# Patient Record
Sex: MALE | Race: WHITE | Employment: FULL TIME | URBAN - METROPOLITAN AREA
[De-identification: names, ages, dates, MRNs, and addresses within clinical notes are randomized per-mention and may not be internally consistent; named-entity substitution may affect disease eponyms.]

---

## 2021-01-12 ENCOUNTER — HOSPITAL ENCOUNTER (INPATIENT)
Age: 41
LOS: 3 days | Discharge: HOME | DRG: 756 | End: 2021-01-15
Attending: EMERGENCY MEDICINE | Admitting: PSYCHIATRY & NEUROLOGY
Payer: MEDICAID

## 2021-01-12 DIAGNOSIS — T14.91XA SUICIDE ATTEMPT (HCC): Primary | ICD-10-CM

## 2021-01-12 PROBLEM — R45.851 DEPRESSION WITH SUICIDAL IDEATION: Status: ACTIVE | Noted: 2021-01-12

## 2021-01-12 PROBLEM — F32.A DEPRESSION WITH SUICIDAL IDEATION: Status: ACTIVE | Noted: 2021-01-12

## 2021-01-12 LAB
ABSOLUTE EOS #: 0.2 K/UL (ref 0–0.4)
ABSOLUTE IMMATURE GRANULOCYTE: ABNORMAL K/UL (ref 0–0.3)
ABSOLUTE LYMPH #: 2.4 K/UL (ref 1–4.8)
ABSOLUTE MONO #: 0.6 K/UL (ref 0.1–1.3)
ACETAMINOPHEN LEVEL: <5 UG/ML (ref 10–30)
ALBUMIN SERPL-MCNC: 4.5 G/DL (ref 3.5–5.2)
ALBUMIN/GLOBULIN RATIO: NORMAL (ref 1–2.5)
ALP BLD-CCNC: 83 U/L (ref 40–129)
ALT SERPL-CCNC: 16 U/L (ref 5–41)
ANION GAP SERPL CALCULATED.3IONS-SCNC: 10 MMOL/L (ref 9–17)
AST SERPL-CCNC: 13 U/L
BASOPHILS # BLD: 1 % (ref 0–2)
BASOPHILS ABSOLUTE: 0.1 K/UL (ref 0–0.2)
BILIRUB SERPL-MCNC: 0.49 MG/DL (ref 0.3–1.2)
BUN BLDV-MCNC: 8 MG/DL (ref 6–20)
BUN/CREAT BLD: NORMAL (ref 9–20)
CALCIUM SERPL-MCNC: 9.7 MG/DL (ref 8.6–10.4)
CHLORIDE BLD-SCNC: 101 MMOL/L (ref 98–107)
CO2: 26 MMOL/L (ref 20–31)
CREAT SERPL-MCNC: 0.8 MG/DL (ref 0.7–1.2)
DIFFERENTIAL TYPE: ABNORMAL
EOSINOPHILS RELATIVE PERCENT: 2 % (ref 0–4)
ETHANOL PERCENT: <0.01 %
ETHANOL: <10 MG/DL
GFR AFRICAN AMERICAN: >60 ML/MIN
GFR NON-AFRICAN AMERICAN: >60 ML/MIN
GFR SERPL CREATININE-BSD FRML MDRD: NORMAL ML/MIN/{1.73_M2}
GFR SERPL CREATININE-BSD FRML MDRD: NORMAL ML/MIN/{1.73_M2}
GLUCOSE BLD-MCNC: 79 MG/DL (ref 70–99)
HCT VFR BLD CALC: 44.4 % (ref 41–53)
HEMOGLOBIN: 15.3 G/DL (ref 13.5–17.5)
IMMATURE GRANULOCYTES: ABNORMAL %
LYMPHOCYTES # BLD: 22 % (ref 24–44)
MCH RBC QN AUTO: 28 PG (ref 26–34)
MCHC RBC AUTO-ENTMCNC: 34.6 G/DL (ref 31–37)
MCV RBC AUTO: 80.9 FL (ref 80–100)
MONOCYTES # BLD: 5 % (ref 1–7)
NRBC AUTOMATED: ABNORMAL PER 100 WBC
PDW BLD-RTO: 13.7 % (ref 11.5–14.9)
PLATELET # BLD: 210 K/UL (ref 150–450)
PLATELET ESTIMATE: ABNORMAL
PMV BLD AUTO: 8.7 FL (ref 6–12)
POTASSIUM SERPL-SCNC: 3.8 MMOL/L (ref 3.7–5.3)
RBC # BLD: 5.49 M/UL (ref 4.5–5.9)
RBC # BLD: ABNORMAL 10*6/UL
SALICYLATE LEVEL: <1 MG/DL (ref 3–10)
SARS-COV-2, RAPID: NOT DETECTED
SARS-COV-2: NORMAL
SARS-COV-2: NORMAL
SEG NEUTROPHILS: 70 % (ref 36–66)
SEGMENTED NEUTROPHILS ABSOLUTE COUNT: 7.9 K/UL (ref 1.3–9.1)
SODIUM BLD-SCNC: 137 MMOL/L (ref 135–144)
SOURCE: NORMAL
TOTAL PROTEIN: 7.6 G/DL (ref 6.4–8.3)
TOXIC TRICYCLIC SC,BLOOD: ABNORMAL
WBC # BLD: 11.2 K/UL (ref 3.5–11)
WBC # BLD: ABNORMAL 10*3/UL

## 2021-01-12 PROCEDURE — U0002 COVID-19 LAB TEST NON-CDC: HCPCS

## 2021-01-12 PROCEDURE — 80053 COMPREHEN METABOLIC PANEL: CPT

## 2021-01-12 PROCEDURE — 90715 TDAP VACCINE 7 YRS/> IM: CPT | Performed by: STUDENT IN AN ORGANIZED HEALTH CARE EDUCATION/TRAINING PROGRAM

## 2021-01-12 PROCEDURE — 6360000002 HC RX W HCPCS: Performed by: STUDENT IN AN ORGANIZED HEALTH CARE EDUCATION/TRAINING PROGRAM

## 2021-01-12 PROCEDURE — 99285 EMERGENCY DEPT VISIT HI MDM: CPT

## 2021-01-12 PROCEDURE — 85025 COMPLETE CBC W/AUTO DIFF WBC: CPT

## 2021-01-12 PROCEDURE — G0480 DRUG TEST DEF 1-7 CLASSES: HCPCS

## 2021-01-12 PROCEDURE — 90471 IMMUNIZATION ADMIN: CPT | Performed by: STUDENT IN AN ORGANIZED HEALTH CARE EDUCATION/TRAINING PROGRAM

## 2021-01-12 PROCEDURE — 80307 DRUG TEST PRSMV CHEM ANLYZR: CPT

## 2021-01-12 PROCEDURE — 36415 COLL VENOUS BLD VENIPUNCTURE: CPT

## 2021-01-12 PROCEDURE — 80179 DRUG ASSAY SALICYLATE: CPT

## 2021-01-12 PROCEDURE — 80143 DRUG ASSAY ACETAMINOPHEN: CPT

## 2021-01-12 PROCEDURE — 1240000000 HC EMOTIONAL WELLNESS R&B

## 2021-01-12 RX ORDER — MAGNESIUM HYDROXIDE/ALUMINUM HYDROXICE/SIMETHICONE 120; 1200; 1200 MG/30ML; MG/30ML; MG/30ML
30 SUSPENSION ORAL EVERY 6 HOURS PRN
Status: DISCONTINUED | OUTPATIENT
Start: 2021-01-12 | End: 2021-01-15 | Stop reason: HOSPADM

## 2021-01-12 RX ORDER — IBUPROFEN 400 MG/1
400 TABLET ORAL EVERY 6 HOURS PRN
Status: DISCONTINUED | OUTPATIENT
Start: 2021-01-12 | End: 2021-01-15 | Stop reason: HOSPADM

## 2021-01-12 RX ORDER — HYDROXYZINE 50 MG/1
50 TABLET, FILM COATED ORAL 3 TIMES DAILY PRN
Status: DISCONTINUED | OUTPATIENT
Start: 2021-01-12 | End: 2021-01-15 | Stop reason: HOSPADM

## 2021-01-12 RX ORDER — HALOPERIDOL 5 MG/ML
5 INJECTION INTRAMUSCULAR EVERY 4 HOURS PRN
Status: DISCONTINUED | OUTPATIENT
Start: 2021-01-12 | End: 2021-01-15 | Stop reason: HOSPADM

## 2021-01-12 RX ORDER — LORAZEPAM 2 MG/ML
2 INJECTION INTRAMUSCULAR EVERY 4 HOURS PRN
Status: DISCONTINUED | OUTPATIENT
Start: 2021-01-12 | End: 2021-01-15 | Stop reason: HOSPADM

## 2021-01-12 RX ORDER — POLYETHYLENE GLYCOL 3350 17 G/17G
17 POWDER, FOR SOLUTION ORAL DAILY PRN
Status: DISCONTINUED | OUTPATIENT
Start: 2021-01-12 | End: 2021-01-15 | Stop reason: HOSPADM

## 2021-01-12 RX ORDER — LORAZEPAM 1 MG/1
2 TABLET ORAL EVERY 4 HOURS PRN
Status: DISCONTINUED | OUTPATIENT
Start: 2021-01-12 | End: 2021-01-15 | Stop reason: HOSPADM

## 2021-01-12 RX ORDER — NICOTINE 21 MG/24HR
1 PATCH, TRANSDERMAL 24 HOURS TRANSDERMAL DAILY
Status: DISCONTINUED | OUTPATIENT
Start: 2021-01-13 | End: 2021-01-15 | Stop reason: HOSPADM

## 2021-01-12 RX ORDER — DIPHENHYDRAMINE HYDROCHLORIDE 50 MG/ML
50 INJECTION INTRAMUSCULAR; INTRAVENOUS EVERY 4 HOURS PRN
Status: DISCONTINUED | OUTPATIENT
Start: 2021-01-12 | End: 2021-01-15 | Stop reason: HOSPADM

## 2021-01-12 RX ORDER — TRAZODONE HYDROCHLORIDE 50 MG/1
50 TABLET ORAL NIGHTLY PRN
Status: DISCONTINUED | OUTPATIENT
Start: 2021-01-12 | End: 2021-01-15 | Stop reason: HOSPADM

## 2021-01-12 RX ORDER — ACETAMINOPHEN 325 MG/1
650 TABLET ORAL EVERY 4 HOURS PRN
Status: DISCONTINUED | OUTPATIENT
Start: 2021-01-12 | End: 2021-01-15 | Stop reason: HOSPADM

## 2021-01-12 RX ORDER — HALOPERIDOL 10 MG/1
5 TABLET ORAL EVERY 4 HOURS PRN
Status: DISCONTINUED | OUTPATIENT
Start: 2021-01-12 | End: 2021-01-15 | Stop reason: HOSPADM

## 2021-01-12 RX ADMIN — TETANUS TOXOID, REDUCED DIPHTHERIA TOXOID AND ACELLULAR PERTUSSIS VACCINE, ADSORBED 0.5 ML: 5; 2.5; 8; 8; 2.5 SUSPENSION INTRAMUSCULAR at 18:39

## 2021-01-12 ASSESSMENT — SLEEP AND FATIGUE QUESTIONNAIRES
DO YOU HAVE DIFFICULTY SLEEPING: YES
DIFFICULTY STAYING ASLEEP: YES
DIFFICULTY FALLING ASLEEP: YES
RESTFUL SLEEP: NO
AVERAGE NUMBER OF SLEEP HOURS: 4

## 2021-01-12 NOTE — ED PROVIDER NOTES
 Smoking status: Current Every Day Smoker     Packs/day: 1.00    Smokeless tobacco: Never Used   Substance and Sexual Activity    Alcohol use: Not Currently    Drug use: Not Currently    Sexual activity: Not on file   Lifestyle    Physical activity     Days per week: Not on file     Minutes per session: Not on file    Stress: Not on file   Relationships    Social connections     Talks on phone: Not on file     Gets together: Not on file     Attends Baptism service: Not on file     Active member of club or organization: Not on file     Attends meetings of clubs or organizations: Not on file     Relationship status: Not on file    Intimate partner violence     Fear of current or ex partner: Not on file     Emotionally abused: Not on file     Physically abused: Not on file     Forced sexual activity: Not on file   Other Topics Concern    Not on file   Social History Narrative    Not on file       History reviewed. No pertinent family history. Allergies:  Patient has no known allergies. Home Medications:  Prior to Admission medications    Not on File       REVIEW OFSYSTEMS    (2-9 systems for level 4, 10 or more for level 5)      Review of Systems   Constitutional: Negative for chills, diaphoresis, fatigue and fever. HENT: Negative for rhinorrhea, sore throat, tinnitus and trouble swallowing. Eyes: Negative for visual disturbance. Respiratory: Negative for cough, chest tightness, shortness of breath and wheezing. Cardiovascular: Negative for chest pain and leg swelling. Gastrointestinal: Negative for abdominal distention, abdominal pain, constipation, diarrhea, nausea and vomiting. Endocrine: Negative for polyuria. Genitourinary: Negative for dysuria, flank pain and frequency. Musculoskeletal: Negative for arthralgias, back pain, joint swelling and myalgias. Skin: Positive for wound. Neurological: Negative for dizziness, tremors, seizures, weakness, light-headedness, numbness and headaches. Psychiatric/Behavioral: Positive for self-injury and suicidal ideas. PHYSICAL EXAM   (up to 7 for level 4, 8 or more forlevel 5)      INITIAL VITALS:   ED Triage Vitals   BP Temp Temp src Pulse Resp SpO2 Height Weight   -- -- -- -- -- -- -- --       Physical Exam  Constitutional:       General: He is not in acute distress. Appearance: He is well-developed. HENT:      Head: Normocephalic and atraumatic. Right Ear: External ear normal.      Left Ear: External ear normal.   Eyes:      General: No scleral icterus. Right eye: No discharge. Left eye: No discharge. Conjunctiva/sclera: Conjunctivae normal.      Pupils: Pupils are equal, round, and reactive to light. Neck:      Musculoskeletal: Normal range of motion. Vascular: No JVD. Trachea: No tracheal deviation. Cardiovascular:      Rate and Rhythm: Regular rhythm. Heart sounds: Normal heart sounds. Pulmonary:      Effort: Pulmonary effort is normal. No respiratory distress. Breath sounds: Normal breath sounds. No stridor. No wheezing. Abdominal:      General: Bowel sounds are normal. There is no distension. Palpations: Abdomen is soft. Tenderness: There is no abdominal tenderness. There is no guarding or rebound. Musculoskeletal: Normal range of motion. General: No tenderness or deformity. Comments: Superficial laceration 4 cm in length vertically bilateral wrists, barely breaking the skin with   Skin:     General: Skin is warm. Coloration: Skin is not pale. Neurological:      Mental Status: He is alert and oriented to person, place, and time. Cranial Nerves: No cranial nerve deficit.          DIFFERENTIAL  DIAGNOSIS     PLAN (LABS / IMAGING / EKG):  Orders Placed This Encounter   Procedures    CBC Auto Differential    Comprehensive Metabolic Panel  TOX SCR, BLD, ED    Urine Drug Screen    Urinalysis Reflex to Culture    COVID-19    DIET GENERAL;    Activity as tolerated    Vital signs, psych    Patient monitoring close Q15 minutes    Misc nursing order (specify)    Full code    IP Consult to History and Physical    PATIENT STATUS (FROM ED OR OR/PROCEDURAL) Inpatient       MEDICATIONS ORDERED:  Orders Placed This Encounter   Medications    Tetanus-Diphth-Acell Pertussis (BOOSTRIX) injection 0.5 mL    acetaminophen (TYLENOL) tablet 650 mg    aluminum & magnesium hydroxide-simethicone (MAALOX) 200-200-20 MG/5ML suspension 30 mL    hydrOXYzine (ATARAX) tablet 50 mg    ibuprofen (ADVIL;MOTRIN) tablet 400 mg    traZODone (DESYREL) tablet 50 mg    polyethylene glycol (GLYCOLAX) packet 17 g    DISCONTD: nicotine polacrilex (NICORETTE) gum 2 mg    AND Linked Order Group     haloperidol (HALDOL) tablet 5 mg     LORazepam (ATIVAN) tablet 2 mg    AND Linked Order Group     haloperidol lactate (HALDOL) injection 5 mg     LORazepam (ATIVAN) injection 2 mg     diphenhydrAMINE (BENADRYL) injection 50 mg    nicotine (NICODERM CQ) 14 MG/24HR 1 patch    sertraline (ZOLOFT) tablet 25 mg    sertraline (ZOLOFT) tablet 50 mg       DDX: Suicide attempt, depression, polysubstance use,    Initial MDM/Plan/ED COURSE:    38 y.o. male who presents with concerns for suicide attempt by cutting himself with razor blades, although patient is \"not confirming or denying \"suicidal ideation, suicide attempt by cutting his wrist, patient does have 2 previous suicide attempts over the past 2 months, stated to staff that if he goes home he will do it again with a knife this time. Patient to be admitted for suicidal ideation.     DIAGNOSTIC RESULTS / EMERGENCYDEPARTMENT COURSE / MDM     LABS:  Labs Reviewed   CBC WITH AUTO DIFFERENTIAL - Abnormal; Notable for the following components:       Result Value    WBC 11.2 (*)     Seg Neutrophils 70 (*) Lymphocytes 22 (*)     All other components within normal limits   TOX SCR, BLD, ED - Abnormal; Notable for the following components:    Acetaminophen Level <5 (*)     Salicylate Lvl <1 (*)     All other components within normal limits   COMPREHENSIVE METABOLIC PANEL   CVLTK-00   URINE DRUG SCREEN   URINE RT REFLEX TO CULTURE           No results found. PROCEDURES:  None    CONSULTS:  IP CONSULT TO HISTORY AND PHYSICAL    CRITICAL CARE:  Please see attending note    FINAL IMPRESSION      1. Suicide attempt [facility])          DISPOSITION / PLAN     DISPOSITION Admitted 01/12/2021 08:41:19 PM      PATIENT REFERRED TO:  [facility] ED  [facility] [address]  [phone]    As needed      DISCHARGE MEDICATIONS:  There are no discharge medications for this patient.       Ondina Maldonado MD  Emergency Medicine Resident    (Please note that portions of this note were completed with a voice recognition program.Efforts were made to edit the dictations but occasionally words are mis-transcribed.)        Ondina Maldonado MD  Resident  01/13/21 0181

## 2021-01-12 NOTE — ED NOTES
Bed: 14  Expected date:   Expected time:   Means of arrival:   Comments:   Rohith Spearsrosa isela [facility]  01/12/21 0816

## 2021-01-12 NOTE — ED NOTES
Lacerations cleaned with soap and water. Lacerations are superficial with no bleeding noted. 4x4s placed over and wrapped with kerlex.       Richelle Mcbride RN  01/12/21 2048

## 2021-01-12 NOTE — ED TRIAGE NOTES
Mode of arrival (squad #, walk in, police, etc) : Medic 21        Chief complaint(s): Suicide attempt        Arrival Note (brief scenario, treatment PTA, etc). : Pt arrives to ED via EMS for a suicide attempt. Per EMS patients' wife called EMS after he messaged her on facebook that he was going to kill himself. Patient was found to have bilateral 4 inch lacerations to each forearm. Bleeding in controlled. Upon arrival to ED patient is alert and oriented. Patient is cooperative but does not answer many questions. Patient responds \"take it how you want it\" when asked if he was trying to kill himself. Patient denies previous suicide attempts to writer. Safeguardplaced at bedsidefor patient watch. Safeguard informed that they need to stay with the patient at all time, must be present in the room and if they need a break or relief to let the nurse know so they can be replaced. Safeguard verbalizes understanding. Belongings and patient checked by security. Belongings locked up. Pt in blue gown. C= \"Have you ever felt that you should Cut down on your drinking? \"  No  A= \"Have people Annoyed you by criticizing your drinking? \"  No  G= \"Have you ever felt bad or Guilty about your drinking? \"  No  E= \"Have you ever had a drink as an Eye-opener first thing in the morning to steady your nerves or to help a hangover? \"  No      Deferred []      Reason for deferring: N/A    *If yes to two or more: probable alcohol abuse. *

## 2021-01-13 PROBLEM — F33.2 MDD (MAJOR DEPRESSIVE DISORDER), RECURRENT SEVERE, WITHOUT PSYCHOSIS (HCC): Status: ACTIVE | Noted: 2021-01-13

## 2021-01-13 PROCEDURE — 99223 1ST HOSP IP/OBS HIGH 75: CPT | Performed by: PSYCHIATRY & NEUROLOGY

## 2021-01-13 PROCEDURE — 1240000000 HC EMOTIONAL WELLNESS R&B

## 2021-01-13 PROCEDURE — 6370000000 HC RX 637 (ALT 250 FOR IP): Performed by: NURSE PRACTITIONER

## 2021-01-13 PROCEDURE — 6370000000 HC RX 637 (ALT 250 FOR IP): Performed by: PSYCHIATRY & NEUROLOGY

## 2021-01-13 RX ORDER — SERTRALINE HYDROCHLORIDE 25 MG/1
25 TABLET, FILM COATED ORAL ONCE
Status: COMPLETED | OUTPATIENT
Start: 2021-01-13 | End: 2021-01-13

## 2021-01-13 RX ADMIN — TRAZODONE HYDROCHLORIDE 50 MG: 50 TABLET ORAL at 22:13

## 2021-01-13 RX ADMIN — HYDROXYZINE HYDROCHLORIDE 50 MG: 50 TABLET, FILM COATED ORAL at 22:13

## 2021-01-13 RX ADMIN — SERTRALINE HYDROCHLORIDE 25 MG: 25 TABLET ORAL at 13:40

## 2021-01-13 ASSESSMENT — ENCOUNTER SYMPTOMS
VOMITING: 0
BACK PAIN: 0
COUGH: 0
NAUSEA: 0
SHORTNESS OF BREATH: 0
WHEEZING: 0
SORE THROAT: 0
DIARRHEA: 0
TROUBLE SWALLOWING: 0
ABDOMINAL DISTENTION: 0
CHEST TIGHTNESS: 0
CONSTIPATION: 0
ABDOMINAL PAIN: 0
RHINORRHEA: 0

## 2021-01-13 ASSESSMENT — SLEEP AND FATIGUE QUESTIONNAIRES
RESTFUL SLEEP: YES
DO YOU HAVE DIFFICULTY SLEEPING: NO
DIFFICULTY STAYING ASLEEP: NO
DIFFICULTY FALLING ASLEEP: NO

## 2021-01-13 ASSESSMENT — LIFESTYLE VARIABLES: HISTORY_ALCOHOL_USE: NO

## 2021-01-13 NOTE — GROUP NOTE
Group Therapy Note    Date: 1/13/2021    Group Start Time: 0900  Group End Time: 0930  Group Topic: Community Meeting    [facility] Lauretta Hodgkins, [address]         Group Therapy Note    Attendees: 6/20            Patient's Goal:  To increase social interaction and to explore and identify short term goals r/t wellness and recovery. RT discussed group schedule and unit structure/resources and encouraged pts to be engaged in their treatment. Pts were given opportunities to ask questions. Notes: Pt participated in group task and was able to identify short term goals r/t wellness and recovery. Pt is pleasant and supportive of peers        Status After Intervention:  Improved     Participation Level:  Active Listener and Interactive     Participation Quality: Appropriate, Attentive, Sharing         Speech:   Normal     Thought Process/Content: Logical,linear     Affective Functioning: Blunted     Mood: euthymic        Level of consciousness:  Alert, and Attentive        Response to Learning: Able to verbalize current knowledge/experience, Able to verbalize/acknowledge new learning, and Progressing to goal        Endings: None Reported     Modes of Intervention: Education, Support, Socialization, Exploration, Clarifying and Problem-solving        Discipline Responsible: Psychoeducational Specialist        Signature:  Dav Ames

## 2021-01-13 NOTE — ED PROVIDER NOTES
[address] [facility] ED     Emergency Department     Faculty Attestation        I performed a history and physical examination of the patient and discussed management with the resident. I reviewed the residents note and agree with the documented findings and plan of care. Any areas of disagreement are noted on the chart. I was personally present for the key portions of any procedures. I have documented in the chart those procedures where I was not present during the key portions. I have reviewed the emergency nurses triage note. I agree with the chief complaint, past medical history, past surgical history, allergies, medications, social and family history as documented unless otherwise noted below. Documentation of the HPI, Physical Exam and Medical Decision Making performed by medical students or scribes is based on my personal performance of the HPI, PE and MDM. For Physician Assistant/ Nurse Practitioner cases/documentation I have have had a face to face evaluation with this patient and have completed at least one if not all key elements of the E/M (history, physical exam, and MDM). Additional findings are as noted. Pertinent Comments     Questionable suicide attempt. On pink slip, will admit.     (Please note that portions of this note were completed with a voice recognition program.  Efforts were made to edit the dictations but occasionally words are mis-transcribed.)    Justo Dawkins DO  Attending Emergency Physician         Justo Dawkins DO  01/12/21 9243

## 2021-01-13 NOTE — H&P
HISTORY and [facility] [address]       NAME:  Mickey Shane  MRN: [id number]   YOB: 1980   Date: 1/13/2021   Age: 38 y.o. Gender: male     COMPLAINT AND PRESENT HISTORY:      Mickey Shane is 38 y.o.,  male, admitted via [facility] ED because of depression. Per chart review, Pt presented to ED accompanied by EMS for suicidal ideation with signs of attempt by cutting his wrists. Pt was determined to be medically stable per ED provider and cleared for transfer to [facility] for mental health evaluation. History of previous suicide attempts by cutting and overdose. Pt denies suicidal and homicidal ideation. No current auditory, visual or tactile hallucinations. Pt has poor sleep having difficulty falling asleep and staying asleep. Reports he is able to sleep for two hours but then wakes up. Reports decreased appetite recently. Current everyday smoker 1 PPD cigarettes. Smoking cessation encouraged. Denies alcohol or illicit drug use. Currently lives with his wife. Pt has superficial lacerations to bilateral wrists which are mildly erythematous, no bleeding, no drainage, no warmth. No signs of infection present. Pt denies lacerations are painful. Pt denies somatic complaints including chest pain/presure, palpitations, SOB, recent URI, N/V/D or constipation, fever or chills. DIAGNOSTIC RESULTS   Labs:  CBC:   Recent Labs     01/12/21  1835   WBC 11.2*   HGB 15.3        BMP:    Recent Labs     01/12/21  1835      K 3.8      CO2 26   BUN 8   CREATININE 0.80   GLUCOSE 79     Hepatic:   Recent Labs     01/12/21  1835   AST 13   ALT 16   BILITOT 0.49   ALKPHOS 83       PAST MEDICAL HISTORY   History reviewed. No pertinent past medical history.     Pt denies any history of Diabetes mellitus type 2, hypertension, stroke, heart disease, COPD, Asthma, GERD, HLD, Cancer, Seizures,Thyroid disease, Kidney Disease, Hepatitis, TB. Gastrointestinal tract: No abdominal pain, nausea, vomiting, dysphagia, diarrhea or constipation. Genitourinary:  No burning on micturition. No hesitancy, urgency, frequency or discoloration of urine. Locomotor:  No bone or joint pains. No swelling or deformities. Neuropsychiatric:  See HPI. GENERAL PHYSICAL EXAM:     Vitals: /78   Pulse 83   Temp 98 °F (36.7 °C) (Oral)   Resp 14   Ht 5' 6\" (1.676 m)   Wt 160 lb (72.6 kg)   SpO2 99%   BMI 25.82 kg/m²  Body mass index is 25.82 kg/m². Pt was examined with a nurse present in the room. GENERAL APPEARANCE:  Shira Corado is 38 y.o.,  male, not obese, nourished, conscious, alert. Does not appear to be in distress or pain at this time. SKIN:  Superficial lacerations to bilateral wrists which are mildly erythematous, no bleeding, no drainage, no warmth. Warm, dry, no cyanosis or jaundice. HEAD:  Normocephalic, atraumatic. EYES:  Pupils equal, reactive to light, Conjunctiva is clear, EOMs intact anil. eyelids WNL. EARS:  No discharge, no marked hearing loss. NOSE:  No rhinorrhea, epistaxis or septal deformity. THROAT:  Not congested. No ulceration bleeding or discharge. NECK:  No stiffness, trachea central.  No palpable masses or L.N.      CHEST:  Symmetrical and equal on expansion. HEART:  Regular rate and rhythm. S1 > S2, No audible murmurs or gallops. LUNGS:  Equal on expansion, normal breath sounds. No wheezing, rhonchi or rales. ABDOMEN:  Soft on palpation. No localized tenderness. No guarding or rigidity. LOCOMOTOR, BACK AND SPINE:  No tenderness or deformities. EXTREMITIES:   strength equal bilaterally, radial pulses palpable 3+ bilaterally, capillary refill brisk bilateral fingers. Symmetrical, no pretibial edema. No calf tenderness. No discoloration or ulcerations. NEUROLOGIC:  The patient is conscious, alert, oriented,Cranial nerve II-XII intact, taste and smell were not examined. No apparent focal sensory or motor deficits. Muscle strength equal Mu. No facial droop, tongue protrudes centrally, no slurring of the speech. PROVISIONAL DIAGNOSES:      Active Problems:    Depression with suicidal ideation  Resolved Problems:    * No resolved hospital problems. *    ASSESSMENT AND PLAN:    1. Depression with suicidal ideation:  Con't current tx plan per psychiatry. Medications to be reviewed per attending and con't per admitting service. 2. Superficial lacerations to bilateral wrists which are mildly erythematous, no bleeding, no drainage, no warmth. Con't to monitor for s/s of infection. 3. No acute medical issues requiring interventions at this time.  Con't to monitor CEASAR Beckett CNP on 1/13/2021 at 10:01 AM

## 2021-01-13 NOTE — ED NOTES
Report given to Judith Berger RN from ED. Report method in person   The following was reviewed with receiving RN:   Current vital signs:  /79   Pulse 83   Temp 98.1 °F (36.7 °C) (Oral)   Resp 20   SpO2 99%                      Any medication or safety alerts were reviewed. Any pending diagnostics and notifications were also reviewed, as well as any safety concerns or issues, abnormal labs, abnormal imaging, and abnormal assessment findings. Questions were answered.             Bala Barrow RN  01/12/21 8321

## 2021-01-13 NOTE — BH NOTE
[facility] Biopsychosocial Assessment    Current Level of Psychosocial Functioning      Independent   Dependent  X  Minimal Assist      Comments:  Client has a principle diagnosis of depression with suicidal ideations, which is the is the condition established to be chiefly responsible for the admission of the client on this date.        Psychosocial High-Risk Factors (check all that apply)     Unable to obtain meds X  Chronic illness/pain    Not taking medications X  Substance abuse   Lack of Family Support   Addictive Behaviors  Financial stress X  Isolation  Inadequate Community Resources X  Suicide attempt(s) X  Homicidal ideations  Self-mutilation  Victim of crime   Legal issues  Developmental Delay  Unable to manage personal needs    Age 72 or older   Homeless  No transportation   Readmission within 30 days   Unemployment  Traumatic Event X    Psychiatric Advanced Directives:   Nothing reported     Family to Involve in Treatment:  Client reports wife will be involved with treatment and will get couples counseling a few months after he receives treatment     Sexual Orientation:        Patient Strengths: employed; safe housing; supportive wife      Patient Barriers:   no health insurance; not linked; first psych admission; relationship issues with wife; focused on discharge     Opiate/AOD Referral and/or Education Provided:   Denies any current substance abuse     CMHC/mental health history:   Link with [employer] of Care:  Medication management, group/individual therapies, family meetings, psychoeducation, 1:1 counseling, treatment team meetings to assist with stabilization     Safety Plan:  Writer initiates safety plan at time of assessment and will be reviewed again in a group setting Initial Discharge Plan:  Stabilize mood and medications; explore social support systems within community to increase socialization; provide  local and national hotline numbers for additional support; safety plan to be completed; disclose/discuss suicidal ideas will improve, decrease depressive symptoms, absence of self-harm; make follow-up appointment with [address] [place] [address]; return to address on face-sheet     Clinical Summary:   Client is met on this date and was alert, fully oriented to self, location, time and situation. Client was friendly, cooperative and presents with good eye contact and bright affect. Mood is congruent with his facial expressions. Good hygiene and ADLs. Client denies any current SI/HI; no hallucinations, delusions or paranoia; no legal issues; and, not taking any medications. Client states this is his first inpatient psychiatric hospitalization and \"I've never been on any medications\". Client understands why was brought to ED but states \"my wife exaggerated\". Client does present with superfiscial cuts on both wrists but is minimizing any plan and intent. Client's wife states in ED 2 prior suicide attempts in 2020 via pills and 2020 with a knife. Writer talks with client regarding these attempts and states \"I never did that\". Client states he would like to get individual treatment at [address] [place] [address] and then couples counseling after a few months. Client states  for 8 years; no children; lost 1 child when 21-years old with old girlfriend while in high school; dropped out of high school, got GED; born and raised in [place], [place]; raised by grandparents; no history of childhood abuse or trauma; moved to [facility] 10-years ago; mother alive, step-father ; father  and no relationship; no history of childhood trauma or abuse; substance abuse when in late teen age years.   Client is interested in treatment, outpatient therapy at [address] [place] [address]

## 2021-01-13 NOTE — SUICIDE SAFETY PLAN
SAFETY PLAN    A suicide Safety Plan is a document that supports someone when they are having thoughts of suicide. Warning Signs that indicate a suicidal crisis may be developing: What (situations, thoughts, feelings, body sensations, behaviors, etc.) do you experience that lets you know you are beginning to think about suicide? 1. Go off medications  2. Mood is depressed and start to feel sad, hopeless, helpless, guilty, decline in self-esteem, excess worry, no interest in doing any pleasurable activities, unable to concentrate  3. Begin to cry over the smallest of things  4. Not eating or sleeping as normal  5. Relationship issues start happening  6. I become angry and start a fight  7. When I dont listen or respond to people in a good, positive way  8. Increase drug use      Internal Coping Strategies:  What things can I do (relaxation techniques, hobbies, physical activities, etc.) to take my mind off my problems without contacting another person? 1. Go to hospital discharge appointments and follow-up with community mental health counseling  2. Talk with other people  3. Learn to identify and control your emotions by new ways  4. Think before you speak or act; walk away from the situation  5. Join a support group in person or on Social Media  6. Take a time-out  7. Take deep breaths; use relaxation techniques  8. Get some exercise; go for a walk  9. Read; listen to music; watch a funny movie    10. Coping skills/ strategies  journal/ listen to music/ go for a walk/ read a book/ watch a funny movie/show / crafts / video game   11.  Grounding techniques- eat a sour candy or hot cinnamon candy / focus on colors, sounds, smells, textures on things around you / drink some herbal tea / eat a piece of dark chocolate / take a hot bath or shower / essential oils for smelling / meditate / color / arts and crafts People whom I can ask for help: Who can I call when I need help - for example, friends, family, clergy, someone else? 1. Jania Man, wife, 4-487.920.2169    Professionals or [facility] agencies I can contact during a crisis: Who can I call for help - for example, my doctor, my psychiatrist, my psychologist, a mental health provider, a suicide hotline? 1. [place]  537-261-7781  2. Suicide Prevention Lifeline: 8-754-318-TALK (0397)  3. [facility]: Emergency Services Address: [address] [facility] [facility], [place],  [address], Phone: [phone]  4. [address] [facility] Emergency Services - for example, [address], [facility] suicide hotline,   1. [place] [address], 9-895.581.2539  2. Mental Health & Recovery Services Board [facility], Crisis line: 318.550.2042  3. [employer] (Crisis Intervention Team - CIT), 924.748.4406 or 9-1-1  4. [address] [place] [address], 6-882.256.1055  5. National Association of Mental Illness, 5-664.466.4641  6. [facility] Substance Abuse National Helpline, 5-376-290-HELP (0619)  7. Crisis Text Line, Text 4HOPE to 584369 to connect with a crisis counselor  8. [facility], 9-325.781.8652  9. JENNIFER (Rape, [address]), 6-862.171.6819  10. [facility] (Alcohol / Drug help)  11. Call the Recovery Helpline at 46 762 500 (24 hours a day - 7 days a week)  12. COVID-19 Emotional Support Line: 726.908.5563    Making the environment safe: How can I make my environment (house/apartment/living space) safer? For example, can I remove guns, medications, and other items? 1. Remove unsafe objects  2. Keep Medications in safe and secure location  3.  Plan daily goals to help remember to stay on specific medications

## 2021-01-13 NOTE — GROUP NOTE
Group Therapy Note    Date: 1/13/2021    Group Start Time: 1000  Group End Time: 2677  Group Topic: Psychotherapy    [facility]    CHANDNI Paula, [facility]        Group Therapy Note    Patient declined to attend psychotherapy group at 10 am despite encouragement by staff. 1:1 was offered as an alternative.           Signature:  CHANDNI Paula, [place]

## 2021-01-13 NOTE — PLAN OF CARE
Problem: Suicide risk  Goal: Provide patient with safe environment  Description: Provide patient with safe environment  1/13/2021 1420 by Bruce Galvin RN  Outcome: Ongoing  Note: Mr. Malu Steele denies suicidal intentions when he cut his wrists. He reports he has never had any previous suicide attempts and that \"This isn't me, these scratches are not even me, I would never kill myself\". Mr. Malu Steele reports he made the comment about \"cutting deeper next time\" to get the Dr's attention. He is agreeable to take medication and he did sign a voluntary form. He reports to this writer that he does not want to be here for treatment, but is agreeable for outpatient treatment. Mr. Malu Steele minimizes his actions and does not demonstrate remorse or endorse a depressed or anxious mood. He does report feeling overwhelmed by \"everything\" and was vague with his description of \"work, no hours, bills piling up, home\". He denies suicidal ideation and thoughts of harm to self and others. Safety rounds maintained. Problem: Tobacco Use:  Goal: Inpatient tobacco use cessation counseling participation  Description: Inpatient tobacco use cessation counseling participation  1/13/2021 1420 by Bruce Galvin RN  Outcome: Ongoing  Note: Mr. Malu Steele utilizes his prescribed nicotine patch. He declines tobacco cessation education at this time.

## 2021-01-13 NOTE — BH NOTE
RT ASSESSMENT TREATMENT GOALS    [x]Pt Goal: Pt will identify 1-2 positive coping skills by time of discharge. [x]Pt Goal: Pt will express 1-2 feelings per group by time of discharge. []Pt Goal: Pt will remain on task/topic for 15-30 minutes during group by time of discharge. []Pt Goal: Pt will identify 1-2 aspects of relapse prevention plan by time of discharge. []Pt Goal: Pt will join in conversation with peers 1-2 times per group by time of discharge. []Pt Goal: Pt will identify 1-2 new leisure interests by time of discharge. []Pt Goal: Pt will not voice any delusional content by time of discharge.

## 2021-01-13 NOTE — BH NOTE
Yudith or Hypomania: denies      Panic Attacks: yes. Phobias: denies     Obsessions and Compulsions:denies     Body or Vocal Tics:  denies     Hallucinations:denies     Delusions: no paranoid/grandiose/erotomania/persecutory/bizarre/non bizarre/mood congruent/ mood incongruent    Past Medical History:    History reviewed. No pertinent past medical history. Past Surgical History:    History reviewed. No pertinent surgical history. Allergies:  Patient has no known allergies. Social History:     [id number] [facility], [address] [place] [address]. LEVEL OF EDUCATION: GED. MARITAL STATUS: . CHILDREN: none. OCCUPATION: works part time at [employer]- as a . RESIDENCE: Currently lives with wife. PATIENT ASSETS: stable housing, supportive spouse, desire for treatment. DRUG USE HISTORY  Social History     Tobacco Use   Smoking Status Current Every Day Smoker    Packs/day: 1.00   Smokeless Tobacco Never Used     Social History     Substance and Sexual Activity   Alcohol Use Not Currently     Social History     Substance and Sexual Activity   Drug Use Not Currently       LEGAL HISTORY:   HISTORY OF INCARCERATION: yes - would not disclose details. Family History:   History reviewed. No pertinent family history. Psychiatric Family History  Denies any family mental health issues, states father was an alcoholic.   0 suicides in family  some substance use in family    PHYSICAL EXAM:  Vitals:  /78   Pulse 83   Temp 98 °F (36.7 °C) (Oral)   Resp 14   Ht 5' 6\" (1.676 m)   Wt 160 lb (72.6 kg)   SpO2 99%   BMI 25.82 kg/m²      Review of Systems   Constitutional: Negative for chills and weight loss. HENT: Negative for ear pain and nosebleeds. Eyes: Negative for blurred vision and photophobia. Respiratory: Negative for cough, shortness of breath and wheezing. Cardiovascular: Negative for chest pain and palpitations. Gastrointestinal: Negative for abdominal pain, diarrhea and vomiting. Genitourinary: Negative for dysuria and urgency. Musculoskeletal: Negative for falls and joint pain. Skin: Negative for itching and rash. Neurological: Negative for tremors, seizures and weakness. Endo/Heme/Allergies: Does not bruise/bleed easily. Physical Exam:      Constitutional:  Appears well-developed and well-nourished, no acute distress  HENT:   Head: Normocephalic and atraumatic. Eyes: Conjunctivae are normal. Right eye exhibits no discharge. Left eye exhibits no discharge. No scleral icterus. Neck: Normal range of motion. Neck supple. Pulmonary/Chest:  No respiratory distress or accessory muscle use, no wheezing. Abdominal: Soft. Exhibits no distension. Musculoskeletal: Normal range of motion. Exhibits no edema. Neurological: cranial nerves II-XII grossly in tact, normal gait and station  Skin: Skin is warm and dry. Patient is not diaphoretic. No erythema. Mental Status Examination:    Level of consciousness:  within normal limits   Appearance:  Hospital attire, seated on chair, fair grooming   Behavior/Motor: no abnormalities noted  Attitude toward examiner:  Cooperative, evasive, minimizing  Speech: normal rate and volume  Mood:  Depressed  Affect:  blunted  Thought processes:  Goal directed, linear  Thought content: active suicidal ideations without current plan or intent               denies homicidal ideations               Denies hallucinations              denies delusions  Cognition:  Oriented to self, location, time, situation  Concentration clinically adequate  Memory: intact  Insight &Judgment: poor    DSM-5 Diagnosis  Major depressive disorder recurrent severe without psychotic features. Psychosocial and Contextual factors:  Financial  Occupational  Relationship  Legal   Living situation  Educational     History reviewed. No pertinent past medical history.      TREATMENT PLAN Risk Management:  close watch per standard protocol      Psychotherapy:  participation in milieu and group and individual sessions with Attending Physician,  and Physician Assistant/CNP      Estimated length of stay:  2-14 days      GENERAL PATIENT/FAMILY EDUCATION  Patient will understand basic signs and symptoms, Patient will understand benefits/risks and potential side effects from proposed meds and Patient will understand their role in recovery. Family is  active in patient's care. Patient assets that may be helpful during treatment include: Intent to participate and engage in treatment, sufficient fund of knowledge and intellect to understand and utilize treatments. Goals:    Start medications, improve mood, link with community providers. Behavioral Services  Medicare Certification     Admission Day 1  I certify that this patient's inpatient psychiatric hospital admission is medically necessary for:    x (1) treatment which could reasonably be expected to improve this patient's condition, or    x (2) diagnostic study or its equivalent. Time Spent: 35 minutes     Physicians Signature:  Electronically signed by CEASAR Dumont NP on 1/13/21 at 11:14 AM EST  I independently saw and evaluated the patient. I reviewed the midlevel provider's documentation above. Any additional comments or changes to the midlevel provider's documentation are stated below otherwise agree with assessment. The patient says that he was admitted to hospital because he was feeling suicidal.  He said that the lot of stressful things happen all at once. The patient states that he has a job but has no hours. 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 He has got bills to pay. He has been arguing with family members. He has high levels of anxiety. He has been feeling hopeless helpless and worthless. The patient has no history of suicide attempts. He does have 1 prior admission to psychiatry and has been diagnosed with ADHD as a child and treated with medication but he did not find it helpful    The patient denies any history of alcohol or recreational substance use. The patient was born and raised in [place]. He had a difficult childhood. He says that his parents were . He was always testing other peoples patients. He says his mother was barely able to care for herself. The patient was in and out of juvenile correction. He was raised by his grandparents. The patient is currently  and has 2 adult children. The indications and risk benefit analysis of Zoloft were discussed. We will order that for the patient          PLAN  PTA medications reconciled. Attempt to develop insight  Psycho-education conducted. Supportive Therapy conducted.   Probable discharge is next week  Follow-up daily while on inpatient unit    Electronically signed by Krzysztof Romero MD on 1/13/21 at 10:09 PM EST

## 2021-01-13 NOTE — PLAN OF CARE
[id number] [address]ain  Initial Interdisciplinary Treatment Plan NO      Original treatment plan Date & Time: 1/13/2021        826AM    Admission Type:  Admission Type: Involuntary    Reason for admission:   Reason for Admission: SI no plan    Estimated Length of Stay:  5-7days  Estimated Discharge Date: to be determined by physician    PATIENT STRENGTHS:  Patient Strengths:Strengths: Positive Support, No significant Physical Illness  Patient Strengths and Limitations:Limitations: Tendency to isolate self, Lacks leisure interests, Difficulty problem solving/relies on others to help solve problems, Hopeless about future, Multiple barriers to leisure interests, Inappropriate/potentially harmful leisure interests (depression substance abuse anxiety poor coping skills)  Addictive Behavior: Addictive Behavior  In the past 3 months, have you felt or has someone told you that you have a problem with:  : None  Do you have a history of Chemical Use?: No  Do you have a history of Alcohol Use?: No  Do you have a history of Street Drug Abuse?: Yes  Histroy of Prescripton Drug Abuse?: No  Medical Problems:No past medical history on file.   Status EXAM:Status and Exam  Normal: No  Facial Expression: Elevated  Affect: Inappropriate  Level of Consciousness: Alert  Mood:Normal: No  Mood: Depressed, Anxious  Motor Activity:Normal: No  Motor Activity: Decreased  Interview Behavior: Cooperative  Preception: [place] to Person, [place] to Time, [place] to Place, [place] to Situation  Attention:Normal: Yes  Attention: Distractible  Thought Processes: Circumstantial  Thought Content:Normal: Yes  Thought Content: Preoccupations  Hallucinations: None  Delusions: No  Memory:Normal: Yes  Memory: Poor Recent, Confabulation  Insight and Judgment: No  Insight and Judgment: Unmotivated  Present Suicidal Ideation: No  Present Homicidal Ideation: No    EDUCATION: Learner Progress Toward Treatment Goals: reviewed group plans and strategies for care    Method:group therapy, medication compliance, individualized assessments and care planning    Outcome: needs reinforcement    PATIENT GOALS: to be discussed with patient within 72 hours    PLAN/TREATMENT RECOMMENDATIONS:     continue group therapy , medications compliance, goal setting, individualized assessments and care, continue to monitor pt on unit      SHORT-TERM GOALS:   Time frame for Short-Term Goals: 5-7 days    LONG-TERM GOALS:  Time frame for Long-Term Goals: 6 months  Members Present in Team Meeting: See Signature Sheet    Darris Dance

## 2021-01-13 NOTE — BH NOTE
`[facility] [place]  Admission Note     Admission Type:   Admission Type: Involuntary    Reason for admission:  Reason for Admission: Patient cut his wrists, police were called. Told his wife he is going to cut deeper next time. PATIENT STRENGTHS:  Strengths: Communication, Positive Support, No significant Physical Illness    Patient Strengths and Limitations:  Limitations: Inappropriate/potentially harmful leisure interests    Addictive Behavior:   Addictive Behavior  In the past 3 months, have you felt or has someone told you that you have a problem with:  : None  Do you have a history of Chemical Use?: No  Do you have a history of Alcohol Use?: No  Do you have a history of Street Drug Abuse?: No  Histroy of Prescripton Drug Abuse?: No    Medical Problems:   History reviewed. No pertinent past medical history. Status EXAM:  Status and Exam  Normal: No  Facial Expression: Flat  Affect: Blunt  Level of Consciousness: Alert  Mood:Normal: No  Mood: Depressed  Motor Activity:Normal: Yes  Interview Behavior: Cooperative  Preception: [place] to Person, Karen Gurney to Time, [place] to Place, [place] to Situation  Thought Processes: Circumstantial  Thought Content:Normal: No  Thought Content: Preoccupations  Hallucinations: None  Delusions: No  Memory:Normal: No  Memory: Poor Recent  Insight and Judgment: No  Insight and Judgment: Poor Judgment, Poor Insight  Present Suicidal Ideation: No  Present Homicidal Ideation: No    Tobacco Screening:  Practical Counseling, on admission, nicole X, if applicable and completed (first 3 are required if patient doesn't refuse):             (x )  Recognizing danger situations (included triggers and roadblocks)                    (x )  Coping skills (new ways to manage stress, exercise, relaxation techniques, changing routine, distraction)

## 2021-01-14 PROCEDURE — 1240000000 HC EMOTIONAL WELLNESS R&B

## 2021-01-14 PROCEDURE — 6370000000 HC RX 637 (ALT 250 FOR IP): Performed by: NURSE PRACTITIONER

## 2021-01-14 PROCEDURE — 99232 SBSQ HOSP IP/OBS MODERATE 35: CPT | Performed by: PSYCHIATRY & NEUROLOGY

## 2021-01-14 PROCEDURE — 6370000000 HC RX 637 (ALT 250 FOR IP): Performed by: PSYCHIATRY & NEUROLOGY

## 2021-01-14 RX ADMIN — SERTRALINE HYDROCHLORIDE 50 MG: 50 TABLET ORAL at 08:45

## 2021-01-14 NOTE — PLAN OF CARE
Problem: Suicide risk  Goal: Provide patient with safe environment  Description: Provide patient with safe environment  1/14/2021 0322 by Sai Thurston RN  Outcome: Ongoing    Problem: Altered Mood, Depressive Behavior:  Goal: Able to verbalize and/or display a decrease in depressive symptoms  Description: Able to verbalize and/or display a decrease in depressive symptoms  1/14/2021 0322 by Sai Thurston RN  Outcome: Ongoing   Patient denies suicidal homicidal ideation, denies side effects from medication. Reports good appetite but poor sleep. Patient is calm and cooperative this shift accepting of prn sleep and anxiety medication.

## 2021-01-14 NOTE — GROUP NOTE
Group Therapy Note    Date: 1/14/2021    Group Start Time: 1430  Group End Time: 1440  Group Topic: Cognitive Skills    ERIC Garner        Group Therapy Note    Attendees: 10/14         Patient's Goal:  To increase social interaction and to practice self expression, explore hope and positive coping after adversity, and practice communication skills. Notes: Pt was attentive to group directions and about to start task; however, group was interrupted by a peer acting out and ensuing Code. Patients were directed to their rooms for safety and unable to engage in group due to length of Code.      Maranda Black

## 2021-01-14 NOTE — PROGRESS NOTES
BEHAVIORAL HEALTH FOLLOW-UP NOTE     1/14/2021     Patient was seen and examined in person, Chart reviewed   Patient's case discussed with staff/team    Chief Complaint: Suicidal ideation    Interim History:     The patient is very discharge focused. He has been taking his medications. He states the medication has helped him feel much better. He is denying any suicidal ideation. We discussed that normally the medication take several days to have good effect. The patient is rashida for safety    /74   Pulse 82   Temp 97.9 °F (36.6 °C) (Oral)   Resp 14   Ht 5' 6\" (1.676 m)   Wt 160 lb (72.6 kg)   SpO2 99%   BMI 25.82 kg/m²   Appetite:   [x] Normal/Unchanged  [] Increased  [] Decreased      Sleep:       [] Normal/Unchanged  [x] Fair       [] Poor              Energy:    [x] Normal/Unchanged  [] Increased  [] Decreased        Aggression:  [] yes  [x] no    Patient is [x] able  [] unable to CONTRACT FOR SAFETY ON THE UNIT    PAST MEDICAL/PSYCHIATRIC HISTORY:   History reviewed. No pertinent past medical history. FAMILY/SOCIAL HISTORY:  History reviewed. No pertinent family history.   Social History     Socioeconomic History    Marital status:      Spouse name: Not on file    Number of children: Not on file    Years of education: Not on file    Highest education level: Not on file   Occupational History    Not on file   Social Needs    Financial resource strain: Not on file    Food insecurity     Worry: Not on file     Inability: Not on file    Transportation needs     Medical: Not on file     Non-medical: Not on file   Tobacco Use    Smoking status: Current Every Day Smoker     Packs/day: 1.00    Smokeless tobacco: Never Used   Substance and Sexual Activity    Alcohol use: Not Currently    Drug use: Not Currently    Sexual activity: Not on file   Lifestyle    Physical activity     Days per week: Not on file     Minutes per session: Not on file    Stress: Not on file Relationships    Social connections     Talks on phone: Not on file     Gets together: Not on file     Attends Jehovah's witness service: Not on file     Active member of club or organization: Not on file     Attends meetings of clubs or organizations: Not on file     Relationship status: Not on file    Intimate partner violence     Fear of current or ex partner: Not on file     Emotionally abused: Not on file     Physically abused: Not on file     Forced sexual activity: Not on file   Other Topics Concern    Not on file   Social History Narrative    Not on file           ROS:  [x] All negative/unchanged except if checked.  Explain positive(checked items) below:  [] Constitutional  [] Eyes  [] Ear/Nose/Mouth/Throat  [] Respiratory  [] CV  [] GI  []   [] Musculoskeletal  [] Skin/Breast  [] Neurological  [] Endocrine  [] Heme/Lymph  [] Allergic/Immunologic    Explanation:     MEDICATIONS:    Current Facility-Administered Medications:     sertraline (ZOLOFT) tablet 50 mg, 50 mg, Oral, Daily, Jaime Yañez APRN - NP, 50 mg at 01/14/21 0845    acetaminophen (TYLENOL) tablet 650 mg, 650 mg, Oral, Q4H PRN, Daimán Major MD    aluminum & magnesium hydroxide-simethicone (MAALOX) 200-200-20 MG/5ML suspension 30 mL, 30 mL, Oral, Q6H PRN, Damián Major MD    hydrOXYzine (ATARAX) tablet 50 mg, 50 mg, Oral, TID PRN, Damián Major MD, 50 mg at 01/13/21 2213    ibuprofen (ADVIL;MOTRIN) tablet 400 mg, 400 mg, Oral, Q6H PRN, Damián Major MD    traZODone (DESYREL) tablet 50 mg, 50 mg, Oral, Nightly PRN, Damián Major MD, 50 mg at 01/13/21 2213    polyethylene glycol (GLYCOLAX) packet 17 g, 17 g, Oral, Daily PRN, Damián Major MD    haloperidol (HALDOL) tablet 5 mg, 5 mg, Oral, Q4H PRN **AND** LORazepam (ATIVAN) tablet 2 mg, 2 mg, Oral, Q4H PRN, Damián Major MD   haloperidol lactate (HALDOL) injection 5 mg, 5 mg, Intramuscular, Q4H PRN **AND** LORazepam (ATIVAN) injection 2 mg, 2 mg, Intramuscular, Q4H PRN **AND** diphenhydrAMINE (BENADRYL) injection 50 mg, 50 mg, Intramuscular, Q4H PRN, Jeff Villafuerte MD    nicotine (NICODERM CQ) 14 MG/24HR 1 patch, 1 patch, Transdermal, Daily, Jeff Villafuerte MD, 1 patch at 01/14/21 0845      Examination:  /74   Pulse 82   Temp 97.9 °F (36.6 °C) (Oral)   Resp 14   Ht 5' 6\" (1.676 m)   Wt 160 lb (72.6 kg)   SpO2 99%   BMI 25.82 kg/m²   Gait - steady  Medication side effects(SE): none    Mental Status Examination:    Level of consciousness:  within normal limits   Appearance:  fair grooming and fair hygiene  Behavior/Motor:  no abnormalities noted  Attitude toward examiner:  cooperative  Speech:  spontaneous, normal rate and normal volume   Mood: euthymic  Affect:  mood congruent  Thought processes:  linear, goal directed and coherent   Thought content:  Homicidal ideation - none  Suicidal Ideation:  denies suicidal ideation  Delusions:  no evidence of delusions  Perceptual Disturbance:  denies any perceptual disturbance  Cognition:  oriented to person, place, and time   Concentration intact  Insight fair   Judgement fair     ASSESSMENT:   Patient symptoms are:  [] Well controlled  [] Improving  [] Worsening  [] No change      Diagnosis:   Active Problems:    MDD (major depressive disorder), recurrent severe, without psychosis ([facility]  Resolved Problems:    * No resolved hospital problems.  *      LABS:    Recent Labs     01/12/21  1835   WBC 11.2*   HGB 15.3        Recent Labs     01/12/21  1835      K 3.8      CO2 26   BUN 8   CREATININE 0.80   GLUCOSE 79     Recent Labs     01/12/21  1835   BILITOT 0.49   ALKPHOS 83   AST 13   ALT 16     No results found for: LABAMPH, BARBSCNU, LABBENZ, CANNAB, COCAINESCRN, LABMETH, OPIATESCREENURINE, PHENCYCLIDINESCREENURINE, PPXUR, ETOH  No results found for: TSH, FREET4 No results found for: LITHIUM  No results found for: VALPROATE, CBMZ    RISK ASSESSMENT: low risk of suicide or harm to others    Treatment Plan:  Reviewed current Medications with the patient. No changes    Risks, benefits, side effects, drug-to-drug interactions and alternatives to treatment were discussed. The patient  consented to treatment. Encourage patient to attend group and other milieu activities. Discharge planning discussed with the patient and treatment team.    PSYCHOTHERAPY/COUNSELING:  [] Therapeutic interview  [x] Supportive  [] CBT  [] Ongoing  [] Other    [x] Patient continues to need, on a daily basis, active treatment furnished directly by or requiring the supervision of inpatient psychiatric personnel      Anticipated Length of stay:1-2 days. Monalisa Lawrence is a 38 y.o. male being evaluated face to face.     --Faye Mayberry MD on 1/14/2021 at 3:49 PM    An electronic signature was used to authenticate this note. **This report has been created using voice recognition software. It may contain minor errors which are inherent in voice recognition technology. **

## 2021-01-14 NOTE — GROUP NOTE
Group Therapy Note    Date: 1/14/2021    Group Start Time: 1600  Group End Time: 5632  Group Topic: Group Therapy    JOAQUIN Johnson RN; Ximena Castillo LPN        Group Therapy Note    Attendees: 9       patient refused to attend wellness group at 1600 after encouragement from staff.   1:1 talk time provided as alternative to group session and refused      Signature:  Yolanda Johnson RN

## 2021-01-14 NOTE — PLAN OF CARE
Problem: Suicide risk  Goal: Provide patient with safe environment  Description: Provide patient with safe environment  1/14/2021 0945 by Tl Mari LPN  Outcome: Ongoing  Patient denies thoughts of self harm and is agreeable to seeking out staff should thoughts of self harm arise. Safe environment maintained. 15 minute checks for safety continued per unit policy. Will continue to monitor for safety and provide support and reassurance as needed. Problem: Tobacco Use:  Goal: Inpatient tobacco use cessation counseling participation  Description: Inpatient tobacco use cessation counseling participation  Outcome: Ongoing   Patient given tobacco quitline number 34109907995 at this time, refusing to call at this time, states \" I just dont want to quit now\"- patient given information as to the dangers of long term tobacco use. Continue to reinforce the importance of tobacco cessation. Patient satisfied with nicotine patch.

## 2021-01-14 NOTE — GROUP NOTE
Group Therapy Note    Date: 1/14/2021    Group Start Time: 1100  Group End Time: 6853  Group Topic: Healthy Living/Wellness    [facility] EDGAR Romero, CTRS      Pt did not attend 1100 wellness/ healthy living group d/t resting in room despite staff invitation to attend. 1:1 talk time offered as alternative to group session, pt declined.          Signature:  Anjel Wills

## 2021-01-14 NOTE — GROUP NOTE
Group Therapy Note    Date: 1/14/2021    Group Start Time: 1330  Group End Time: 3825  Group Topic: Psychoeducation    JOAQUIN Harrison, CTRS      Pt did not attend 1330 psychoeducatoin  group d/t resting in room despite staff invitation to attend. 1:1 talk time offered as alternative to group session, pt declined.             Signature:  Magaly Rivera

## 2021-01-14 NOTE — PLAN OF CARE
[address] [place] [address]  Day 3 Interdisciplinary Treatment Plan NOTE    Review Date & Time: 1/14/2021    1300    Admission Type:   Admission Type: Involuntary    Reason for admission:  Reason for Admission: SI no plan  Estimated Length of Stay: 5-7 days  Estimated Discharge Date Update: to be determined by physician    PATIENT STRENGTHS:  Patient Strengths Strengths: Positive Support, No significant Physical Illness  Patient Strengths and Limitations:Limitations: Tendency to isolate self, Lacks leisure interests, Difficulty problem solving/relies on others to help solve problems, Hopeless about future, Multiple barriers to leisure interests, Inappropriate/potentially harmful leisure interests (depression substance abuse anxiety poor coping skills)  Addictive Behavior:Addictive Behavior  In the past 3 months, have you felt or has someone told you that you have a problem with:  : None  Do you have a history of Chemical Use?: No  Do you have a history of Alcohol Use?: No  Do you have a history of Street Drug Abuse?: Yes  Histroy of Prescripton Drug Abuse?: No  Medical Problems:No past medical history on file.     Risk:  Fall RiskTotal: 53  Calixto Scale Calixto Scale Score: 22  BVC Total: 0  Change in scores no Changes to plan of Care no    Status EXAM:   Status and Exam  Normal: No  Facial Expression: Elevated  Affect: Inappropriate  Level of Consciousness: Alert  Mood:Normal: No  Mood: Depressed, Anxious  Motor Activity:Normal: No  Motor Activity: Decreased  Interview Behavior: Cooperative  Preception: Inez to Person, Ozan Lever to Time, Inez to Place, Inez to Situation  Attention:Normal: Yes  Attention: Distractible  Thought Processes: Circumstantial  Thought Content:Normal: Yes  Thought Content: Preoccupations  Hallucinations: None  Delusions: No  Memory:Normal: Yes  Memory: Poor Recent, Confabulation  Insight and Judgment: No  Insight and Judgment: Unmotivated  Present Suicidal Ideation: No Present Homicidal Ideation: No    Daily Assessment Last Entry:   Daily Sleep (WDL): Within Defined Limits         Patient Currently in Pain: No  Daily Nutrition (WDL): Within Defined Limits    Patient Monitoring:  Frequency of Checks: 4 times per hour, close    Psychiatric Symptoms:   Depression Symptoms  Depression Symptoms: Isolative, Loss of interest  Anxiety Symptoms  Anxiety Symptoms: Generalized  Yudith Symptoms  Yudith Symptoms: No problems reported or observed. Psychosis Symptoms  Delusion Type: No problems reported or observed. Suicide Risk CSSR-S:  Have you wished you were dead or wished you could go to sleep and not wake up? : NO  Have you actually had any thoughts of killing yourself? : NO  Have you ever done anything, started to do anything, or prepared to do anything to end your life?: NO  Change in Result              NO                  Change in Plan of care             NO      EDUCATION:   EDUCATION:   Learner Progress Toward Treatment Goals: Reviewed results and recommendations of this team, Reviewed group plan and strategies, Reviewed signs, symptoms and risk of self harm and violent behavior, Reviewed goals and plan of care    Method:small group, individual verbal education    Outcome:verbalized by patient, but needs reinforcement to obtain goals    PATIENT GOALS:  Short term:  \"MORE [address] I ACT\"  Long term: \"USE MY WALKS TO RELAX,OPEN UP, TALK MORE\"    PLAN/TREATMENT RECOMMENDATIONS UPDATE: continue with group therapies, increased socialization, continue planning for after discharge goals, continue with medication compliance    SHORT-TERM GOALS UPDATE:   Time frame for Short-Term Goals: 5-7 days    LONG-TERM GOALS UPDATE:   Time frame for Long-Term Goals: 6 months  Members Present in Team Meeting: See Signature Sheet    Franklyn Gutierrez

## 2021-01-14 NOTE — GROUP NOTE
Group Therapy Note    Date: 1/14/2021    Group Start Time: 1100  Group End Time: 4141  Group Topic: Psychotherapy    [facility] D    Anne Coronel        Group Therapy Note           Patient refused to attend psychotherapy group after encouragement from staff. 1:1 talk time offered but refused. Signature:   Anne Coronel

## 2021-01-14 NOTE — GROUP NOTE
Group Therapy Note    Date: 1/14/2021    Group Start Time: 0900  Group End Time: 0940  Group Topic: Community Meeting    JOAQUIN Brown [place], [place]        Group Therapy Note    Attendees: 6/16         Pt did not participate in Community Meeting/Goals Group at 900am when encouraged by RT due to resting in room. Pt was offered talk time as an alternative to group but declined.       Discipline Responsible: Psychoeducational Specialist      Signature:  Tabatha Wilkins

## 2021-01-15 VITALS
BODY MASS INDEX: 25.71 KG/M2 | OXYGEN SATURATION: 99 % | HEIGHT: 66 IN | RESPIRATION RATE: 14 BRPM | DIASTOLIC BLOOD PRESSURE: 79 MMHG | WEIGHT: 160 LBS | TEMPERATURE: 97.8 F | SYSTOLIC BLOOD PRESSURE: 126 MMHG | HEART RATE: 80 BPM

## 2021-01-15 PROCEDURE — 6370000000 HC RX 637 (ALT 250 FOR IP): Performed by: NURSE PRACTITIONER

## 2021-01-15 PROCEDURE — 6370000000 HC RX 637 (ALT 250 FOR IP): Performed by: PSYCHIATRY & NEUROLOGY

## 2021-01-15 PROCEDURE — 99238 HOSP IP/OBS DSCHRG MGMT 30/<: CPT | Performed by: PSYCHIATRY & NEUROLOGY

## 2021-01-15 RX ADMIN — SERTRALINE HYDROCHLORIDE 50 MG: 50 TABLET ORAL at 09:10

## 2021-01-15 RX ADMIN — HYDROXYZINE HYDROCHLORIDE 50 MG: 50 TABLET, FILM COATED ORAL at 09:10

## 2021-01-15 NOTE — GROUP NOTE
Group Therapy Note    Date: 1/15/2021    Group Start Time: 1100  Group End Time: 5244  Group Topic: Cognitive Skills    ERIC Duvall           Pt did not attend 1100 skills group d/t resting in room despite staff invitation to attend. 1:1 talk time offered as alternative to group session, pt declined.       Signature:  Job Ast

## 2021-01-15 NOTE — GROUP NOTE
Group Therapy Note    Date: 1/15/2021    Group Start Time: 1330  Group End Time: 1238  Group Topic: Cognitive Skills    [facility]    General Maizes, CTRS        Group Therapy Note    Attendees:          Patient's Goal:  ***    Notes:  ***    Status After Intervention:  {Status After Intervention:212924228}    Participation Level: {Participation Level:898940812}    Participation Quality: {[facility] PARTICIPATION QUALITY:228358191}      Speech:  {ED  CD_SPEECH:15609}      Thought Process/Content: {Thought Process/Content:085129948}      Affective Functioning: {Affective Functionin}      Mood: {Mood:204202630}      Level of consciousness:  {Level of consciousness:114554325}      Response to Learning: {[facility] Responses to Learnin}      Endings: {[facility] Endings:52596}    Modes of Intervention: {[facility] Modes of Intervention:607585191}      Discipline Responsible: {[facility] Multidisciplinary:614716945}      Signature:  Leyda Mejia

## 2021-01-15 NOTE — PLAN OF CARE
Problem: Suicide risk  Goal: Provide patient with safe environment  Description: Provide patient with safe environment  Outcome: Completed     Problem: Tobacco Use:  Goal: Inpatient tobacco use cessation counseling participation  Description: Inpatient tobacco use cessation counseling participation  Outcome: Completed     Problem: Altered Mood, Depressive Behavior:  Goal: Able to verbalize and/or display a decrease in depressive symptoms  Description: Able to verbalize and/or display a decrease in depressive symptoms  Outcome: Completed

## 2021-01-15 NOTE — GROUP NOTE
Group Therapy Note    Date: 1/15/2021    Group Start Time: 1000  Group End Time: 1876  Group Topic: Psychotherapy    CHANDNI Brooks, [facility]        Group Therapy Note    Patient declined to attend psychotherapy group at 10 am despite encouragement by staff. 1:1 was offered as an alternative.             Signature:  CHANDNI [place], [place]

## 2021-01-15 NOTE — GROUP NOTE
Group Therapy Note    Date: 1/15/2021    Group Start Time: 0900  Group End Time: 6051  Group Topic: Community Meeting    ERIC Rivas    Pt did not attend 0900 goal setting group d/t resting in room despite staff invitation to attend. 1:1 talk time offered as alternative to group session, pt declined.               Signature:  Isael Duran

## 2021-01-15 NOTE — PLAN OF CARE
Problem: Suicide risk  Goal: Provide patient with safe environment  Description: Provide patient with safe environment  1/14/2021 2228 by Natalia Rosen LPN  Outcome: Ongoing  Note: Patient provided with safe environment. Patient safety check every 15 minute     Problem: Tobacco Use:  Goal: Inpatient tobacco use cessation counseling participation  Description: Inpatient tobacco use cessation counseling participation  1/14/2021 2228 by Natalia Rosen LPN  Outcome: Ongoing  Note: Patient given tobacco quitline number 59686689091 at this time, refusing to call at this time, states \" I just dont want to quit now\"- patient given information as to the dangers of long term tobacco use. Continue to reinforce the importance of tobacco cessation. Problem: Altered Mood, Depressive Behavior:  Goal: Able to verbalize and/or display a decrease in depressive symptoms  Description: Able to verbalize and/or display a decrease in depressive symptoms  1/14/2021 2228 by Natalia Rosen LPN  Outcome: Ongoing  Note: Patient admits to some depression, denies suicidal ideations and has some anxiety, aloof with peers. Patient safety check every 15 minute

## 2021-01-15 NOTE — BH NOTE
Patient given tobacco quitline number 02101811419 at this time, refusing to call at this time, states \" I just dont want to quit now\"- patient given information as to the dangers of long term tobacco use. Continue to reinforce the importance of tobacco cessation.

## 2021-01-15 NOTE — PROGRESS NOTES
CLINICAL PHARMACY NOTE: MEDS TO [address] Select Patient?: No  Total # of Prescriptions Filled: 1   The following medications were delivered to the patient:  · Sertraline  ·   Total # of Interventions Completed: 0  Time Spent (min): 30    Additional Documentation:

## 2021-01-15 NOTE — DISCHARGE SUMMARY
DISCHARGE SUMMARY      Patient ID:  Cammie Jane  [id number]  7 y.o.  1980    Admit date: 1/12/2021    Discharge date and time: 1/15/2021    Disposition: Home     Admitting Physician: Magda Spurling, MD     Discharge Physician: Dr Ulises Rojas MD    Admission Diagnoses: Depression with suicidal ideation [F32.9, R45.851]    Admission Condition: poor    Discharged Condition: stable    Admission Circumstance: The patient was admitted to psychiatry on a pink slip. He reported suicidal ideation and had superficial lacerations to bilateral wrists. He reported feeling overwhelmed with a number of stressful events in his life. His job hours have been reduced because of COVID-19. He works in security. He is behind on his bills and he has been in arguments with family members      PAST MEDICAL/PSYCHIATRIC HISTORY:   History reviewed. No pertinent past medical history. FAMILY/SOCIAL HISTORY:  History reviewed. No pertinent family history.   Social History     Socioeconomic History    Marital status:      Spouse name: Not on file    Number of children: Not on file    Years of education: Not on file    Highest education level: Not on file   Occupational History    Not on file   Social Needs    Financial resource strain: Not on file    Food insecurity     Worry: Not on file     Inability: Not on file    Transportation needs     Medical: Not on file     Non-medical: Not on file   Tobacco Use    Smoking status: Current Every Day Smoker     Packs/day: 1.00    Smokeless tobacco: Never Used   Substance and Sexual Activity    Alcohol use: Not Currently    Drug use: Not Currently    Sexual activity: Not on file   Lifestyle    Physical activity     Days per week: Not on file     Minutes per session: Not on file    Stress: Not on file   Relationships    Social connections     Talks on phone: Not on file     Gets together: Not on file     Attends Spiritism service: Not on file Active member of club or organization: Not on file     Attends meetings of clubs or organizations: Not on file     Relationship status: Not on file    Intimate partner violence     Fear of current or ex partner: Not on file     Emotionally abused: Not on file     Physically abused: Not on file     Forced sexual activity: Not on file   Other Topics Concern    Not on file   Social History Narrative    Not on file       MEDICATIONS:    Current Facility-Administered Medications:     sertraline (ZOLOFT) tablet 50 mg, 50 mg, Oral, Daily, Geraline Grange, APRN - NP, 50 mg at 01/15/21 0910    acetaminophen (TYLENOL) tablet 650 mg, 650 mg, Oral, Q4H PRN, Alexander Boles MD    aluminum & magnesium hydroxide-simethicone (MAALOX) 200-200-20 MG/5ML suspension 30 mL, 30 mL, Oral, Q6H PRN, Alexander Boles MD    hydrOXYzine (ATARAX) tablet 50 mg, 50 mg, Oral, TID PRN, Alexander Boles MD, 50 mg at 01/15/21 0910    ibuprofen (ADVIL;MOTRIN) tablet 400 mg, 400 mg, Oral, Q6H PRN, Alexander Boles MD    traZODone (DESYREL) tablet 50 mg, 50 mg, Oral, Nightly PRN, Alexander Boles MD, 50 mg at 01/13/21 2213    polyethylene glycol (GLYCOLAX) packet 17 g, 17 g, Oral, Daily PRN, Alexander Boles MD    haloperidol (HALDOL) tablet 5 mg, 5 mg, Oral, Q4H PRN **AND** LORazepam (ATIVAN) tablet 2 mg, 2 mg, Oral, Q4H PRN, Alexander Boles MD    haloperidol lactate (HALDOL) injection 5 mg, 5 mg, Intramuscular, Q4H PRN **AND** LORazepam (ATIVAN) injection 2 mg, 2 mg, Intramuscular, Q4H PRN **AND** diphenhydrAMINE (BENADRYL) injection 50 mg, 50 mg, Intramuscular, Q4H PRN, Alexander Boles MD    nicotine (NICODERM CQ) 14 MG/24HR 1 patch, 1 patch, Transdermal, Daily, Alexander Boles MD, 1 patch at 01/15/21 0910    Examination:  /79   Pulse 80   Temp 97.8 °F (36.6 °C) (Oral)   Resp 14   Ht 5' 6\" (1.676 m)   Wt 160 lb (72.6 kg)   SpO2 99%   BMI 25.82 kg/m²   Gait - steady    HOSPITAL COURSE[de-identified] Following admission to the hospital, patient had a complete physical exam and blood work up, which was unremarkable. Patient was monitored closely with suicide precaution  Patient was started on Zoloft which she responded well  Was encouraged to participate in group and other milieu activity  Patient started to feel better with this combination of treatment. Significant progress in the symptoms since admission. Mood is improved  The patient denies AVH or paranoid thoughts  The patient denies any hopelessness or worthlessness  No active SI/HI  Appetite:  [x] Normal  [] Increased  [] Decreased    Sleep:       [x] Normal  [] Fair       [] Poor            Energy:    [x] Normal  [] Increased  [] Decreased     SI [] Present  [x] Absent  HI  []Present  [x] Absent   Aggression:  [] yes  [] no  Patient is [x] able  [] unable to CONTRACT FOR SAFETY   Medication side effects(SE):  [x] None(Psych. Meds.) [] Other      Mental Status Examination on discharge:    Level of consciousness:  within normal limits   Appearance:  well-appearing  Behavior/Motor:  no abnormalities noted  Attitude toward examiner:  attentive and good eye contact  Speech:  spontaneous, normal rate and normal volume   Mood: euthymic  Affect:  mood congruent  Thought processes:  linear, goal directed and coherent   Thought content:  Suicidal Ideation:  denies suicidal ideation  Delusions:  no evidence of delusions  Perceptual Disturbance:  denies any perceptual disturbance  Cognition:  oriented to person, place, and time   Concentration intact  Memory intact  Insight good   Judgement fair   Fund of Knowledge adequate      ASSESSMENT:  Patient symptoms are:  [x] Well controlled  [x] Improving  [] Worsening  [] No change      Diagnosis:  Active Problems:    MDD (major depressive disorder), recurrent severe, without psychosis ([facility] [address]  Resolved Problems:    * No resolved hospital problems.  *      LABS:    Recent Labs     01/12/21  1835   WBC 11.2*   HGB 15.3